# Patient Record
Sex: FEMALE | Race: WHITE | NOT HISPANIC OR LATINO | Employment: OTHER | URBAN - METROPOLITAN AREA
[De-identification: names, ages, dates, MRNs, and addresses within clinical notes are randomized per-mention and may not be internally consistent; named-entity substitution may affect disease eponyms.]

---

## 2018-01-26 ENCOUNTER — OFFICE VISIT (OUTPATIENT)
Dept: INTERNAL MEDICINE | Facility: CLINIC | Age: 83
End: 2018-01-26

## 2018-01-26 VITALS
OXYGEN SATURATION: 97 % | TEMPERATURE: 98.2 F | SYSTOLIC BLOOD PRESSURE: 146 MMHG | BODY MASS INDEX: 24.07 KG/M2 | DIASTOLIC BLOOD PRESSURE: 82 MMHG | HEART RATE: 94 BPM | WEIGHT: 141 LBS | HEIGHT: 64 IN

## 2018-01-26 DIAGNOSIS — K58.1 IRRITABLE BOWEL SYNDROME WITH CONSTIPATION: ICD-10-CM

## 2018-01-26 DIAGNOSIS — K74.60 CIRRHOSIS OF LIVER WITHOUT ASCITES, UNSPECIFIED HEPATIC CIRRHOSIS TYPE (HCC): ICD-10-CM

## 2018-01-26 DIAGNOSIS — J30.89 ACUTE NON-SEASONAL ALLERGIC RHINITIS, UNSPECIFIED TRIGGER: ICD-10-CM

## 2018-01-26 DIAGNOSIS — I10 ESSENTIAL HYPERTENSION: Primary | ICD-10-CM

## 2018-01-26 PROBLEM — C19 COLORECTAL CANCER (HCC): Status: ACTIVE | Noted: 2017-01-23

## 2018-01-26 PROCEDURE — 99214 OFFICE O/P EST MOD 30 MIN: CPT | Performed by: FAMILY MEDICINE

## 2018-01-26 RX ORDER — MELATONIN
1000 DAILY
COMMUNITY

## 2018-01-26 RX ORDER — HYDROCODONE BITARTRATE AND ACETAMINOPHEN 5; 325 MG/1; MG/1
1 TABLET ORAL EVERY 8 HOURS PRN
Qty: 60 TABLET | Refills: 0 | Status: SHIPPED | OUTPATIENT
Start: 2018-01-26 | End: 2018-06-15 | Stop reason: HOSPADM

## 2018-01-26 RX ORDER — AZELASTINE 1 MG/ML
1 SPRAY, METERED NASAL
COMMUNITY
Start: 2017-06-02 | End: 2018-08-27 | Stop reason: ALTCHOICE

## 2018-01-26 RX ORDER — CITALOPRAM 10 MG/1
10 TABLET ORAL
COMMUNITY
Start: 2016-06-20 | End: 2018-01-26

## 2018-01-26 RX ORDER — HYDROCHLOROTHIAZIDE 12.5 MG/1
12.5 TABLET ORAL
COMMUNITY
Start: 2017-01-04 | End: 2018-08-27 | Stop reason: ALTCHOICE

## 2018-01-26 RX ORDER — LISINOPRIL 20 MG/1
40 TABLET ORAL
COMMUNITY
Start: 2017-12-10

## 2018-01-26 RX ORDER — DULOXETIN HYDROCHLORIDE 30 MG/1
30 CAPSULE, DELAYED RELEASE ORAL DAILY
COMMUNITY

## 2018-01-26 RX ORDER — IPRATROPIUM BROMIDE 21 UG/1
2 SPRAY, METERED NASAL EVERY 12 HOURS
Qty: 30 ML | Refills: 12 | Status: SHIPPED | OUTPATIENT
Start: 2018-01-26 | End: 2018-08-27 | Stop reason: ALTCHOICE

## 2018-01-26 RX ORDER — HYDROCODONE BITARTRATE AND ACETAMINOPHEN 5; 325 MG/1; MG/1
TABLET ORAL
COMMUNITY
Start: 2017-10-27 | End: 2018-01-26 | Stop reason: SDUPTHER

## 2018-01-26 RX ORDER — ZOLPIDEM TARTRATE 5 MG/1
TABLET ORAL
COMMUNITY
Start: 2018-01-12

## 2018-01-26 NOTE — PROGRESS NOTES
Subjective   Kerry Donovan is a 86 y.o. female.     Chief Complaint   Patient presents with   • Pain   • Osteoarthritis   • GI Problem   • Hypertension         History of Present Illness   Marianna BIRD patient with history of irritable bowel syndrome taking MiraLAX and stool softener.  There is some history of cirrhosis although she seemed asymptomatic from this of the recent past records.    She is seen pain management for periodic injections with a history of lumbar pain chronically as well as history of broken pelvis and chronic pain relation to that.    She lives alone and has a daughter who lives nearby.  She has chronic neuropathy relating to prior chemotherapy from rectal cancer treatment.    Recently she has developed postnasal drainage present to be allergic origin.      The following portions of the patient's history were reviewed and updated as appropriate: allergies, current medications, past social history and problem list.    Review of Systems   Constitutional: Negative.    HENT: Negative.    Eyes: Negative.    Respiratory: Negative.    Cardiovascular: Negative.    Gastrointestinal: Negative.    Endocrine: Negative.    Genitourinary: Negative.    Musculoskeletal: Negative.    Skin: Negative.    Allergic/Immunologic: Negative.    Neurological: Negative.    Hematological: Negative.    Psychiatric/Behavioral: Negative.        Objective   Vitals:    01/26/18 1245   BP: 146/82   Pulse: 94   Temp: 98.2 °F (36.8 °C)   SpO2: 97%     Physical Exam   Constitutional: She is oriented to person, place, and time. She appears well-developed and well-nourished.   HENT:   Head: Normocephalic and atraumatic.   Right Ear: Tympanic membrane and external ear normal.   Left Ear: Tympanic membrane and external ear normal.   Nose: Nose normal.   Mouth/Throat: Oropharynx is clear and moist.   Eyes: Conjunctivae and EOM are normal. Pupils are equal, round, and reactive to light.   Neck: Normal range of motion. Neck supple. No JVD  present. No thyromegaly present.   Cardiovascular: Normal rate, regular rhythm, normal heart sounds and intact distal pulses.    Pulmonary/Chest: Effort normal and breath sounds normal.   Abdominal: Soft. Bowel sounds are normal.   Musculoskeletal:        Right hip: She exhibits decreased range of motion.        Left hip: She exhibits decreased range of motion.        Thoracic back: She exhibits decreased range of motion.        Lumbar back: She exhibits decreased range of motion.   Lymphadenopathy:     She has no cervical adenopathy.   Neurological: She is alert and oriented to person, place, and time. No cranial nerve deficit. Coordination normal.   Skin: Skin is warm and dry. No rash noted.   Psychiatric: She has a normal mood and affect. Her behavior is normal. Judgment and thought content normal.   Vitals reviewed.      Assessment/Plan   Problem List Items Addressed This Visit        Cardiovascular and Mediastinum    HTN (hypertension) - Primary    Relevant Medications    hydrochlorothiazide (HYDRODIURIL) 12.5 MG tablet    lisinopril (PRINIVIL,ZESTRIL) 20 MG tablet       Digestive    Cirrhosis    IBS (irritable bowel syndrome)      Other Visit Diagnoses     Acute non-seasonal allergic rhinitis, unspecified trigger          Plan: Medications are continued control substances agreement signed hydrocodone 5 mg every 8 when necessary #60.  Recheck in 6 months for Medicare wellness and labs.  Trial of Atrovent nasal spray 0.03% 2 puffs 3 times a day when necessary each nostril.

## 2018-06-13 ENCOUNTER — HOSPITAL ENCOUNTER (OUTPATIENT)
Facility: HOSPITAL | Age: 83
Setting detail: OBSERVATION
Discharge: NURSING, REHAB OR LONG-TERM CARE | End: 2018-06-15
Attending: EMERGENCY MEDICINE | Admitting: HOSPITALIST

## 2018-06-13 DIAGNOSIS — S12.001A CLOSED NONDISPLACED FRACTURE OF FIRST CERVICAL VERTEBRA, UNSPECIFIED FRACTURE MORPHOLOGY, INITIAL ENCOUNTER (HCC): Primary | ICD-10-CM

## 2018-06-13 DIAGNOSIS — S32.010A CLOSED COMPRESSION FRACTURE OF FIRST LUMBAR VERTEBRA, INITIAL ENCOUNTER: ICD-10-CM
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
## 2018-06-13 DIAGNOSIS — Z91.81 AT RISK FOR FALLS: ICD-10-CM

## 2018-06-13 DIAGNOSIS — R26.2 DIFFICULTY WALKING: ICD-10-CM

## 2018-06-13 PROCEDURE — 99283 EMERGENCY DEPT VISIT LOW MDM: CPT

## 2018-06-13 PROCEDURE — 96360 HYDRATION IV INFUSION INIT: CPT

## 2018-06-13 PROCEDURE — 96361 HYDRATE IV INFUSION ADD-ON: CPT

## 2018-06-14 ENCOUNTER — APPOINTMENT (OUTPATIENT)
Dept: CT IMAGING | Facility: HOSPITAL | Age: 83
End: 2018-06-14
Attending: NEUROLOGICAL SURGERY

## 2018-06-14 PROBLEM — T14.8XXA ABRASION OF SKIN: Status: ACTIVE | Noted: 2018-06-14

## 2018-06-14 PROBLEM — S12.001A CLOSED NONDISPLACED FRACTURE OF FIRST CERVICAL VERTEBRA (HCC): Status: ACTIVE | Noted: 2018-06-14

## 2018-06-14 PROBLEM — S01.01XA SCALP LACERATION: Status: ACTIVE | Noted: 2018-06-14

## 2018-06-14 LAB
ALBUMIN SERPL-MCNC: 3.6 G/DL (ref 3.5–5.2)
ALBUMIN/GLOB SERPL: 1.3 G/DL
ALP SERPL-CCNC: 71 U/L (ref 39–117)
ALT SERPL W P-5'-P-CCNC: 26 U/L (ref 1–33)
ANION GAP SERPL CALCULATED.3IONS-SCNC: 10.4 MMOL/L
AST SERPL-CCNC: 39 U/L (ref 1–32)
BASOPHILS # BLD AUTO: 0.02 10*3/MM3 (ref 0–0.2)
BASOPHILS NFR BLD AUTO: 0.3 % (ref 0–1.5)
BILIRUB SERPL-MCNC: 1.8 MG/DL (ref 0.1–1.2)
BUN BLD-MCNC: 11 MG/DL (ref 8–23)
BUN/CREAT SERPL: 19.6 (ref 7–25)
CALCIUM SPEC-SCNC: 9.4 MG/DL (ref 8.6–10.5)
CHLORIDE SERPL-SCNC: 97 MMOL/L (ref 98–107)
CK SERPL-CCNC: 189 U/L (ref 20–180)
CO2 SERPL-SCNC: 28.6 MMOL/L (ref 22–29)
CREAT BLD-MCNC: 0.56 MG/DL (ref 0.57–1)
DEPRECATED RDW RBC AUTO: 49.3 FL (ref 37–54)
EOSINOPHIL # BLD AUTO: 0.11 10*3/MM3 (ref 0–0.7)
EOSINOPHIL NFR BLD AUTO: 1.6 % (ref 0.3–6.2)
ERYTHROCYTE [DISTWIDTH] IN BLOOD BY AUTOMATED COUNT: 13.6 % (ref 11.7–13)
GFR SERPL CREATININE-BSD FRML MDRD: 103 ML/MIN/1.73
GLOBULIN UR ELPH-MCNC: 2.7 GM/DL
GLUCOSE BLD-MCNC: 101 MG/DL (ref 65–99)
HCT VFR BLD AUTO: 37.3 % (ref 35.6–45.5)
HGB BLD-MCNC: 12.2 G/DL (ref 11.9–15.5)
IMM GRANULOCYTES # BLD: 0 10*3/MM3 (ref 0–0.03)
IMM GRANULOCYTES NFR BLD: 0 % (ref 0–0.5)
LYMPHOCYTES # BLD AUTO: 0.92 10*3/MM3 (ref 0.9–4.8)
LYMPHOCYTES NFR BLD AUTO: 13.3 % (ref 19.6–45.3)
MCH RBC QN AUTO: 32.5 PG (ref 26.9–32)
MCHC RBC AUTO-ENTMCNC: 32.7 G/DL (ref 32.4–36.3)
MCV RBC AUTO: 99.5 FL (ref 80.5–98.2)
MONOCYTES # BLD AUTO: 0.85 10*3/MM3 (ref 0.2–1.2)
MONOCYTES NFR BLD AUTO: 12.3 % (ref 5–12)
NEUTROPHILS # BLD AUTO: 5 10*3/MM3 (ref 1.9–8.1)
NEUTROPHILS NFR BLD AUTO: 72.5 % (ref 42.7–76)
PLATELET # BLD AUTO: 179 10*3/MM3 (ref 140–500)
PMV BLD AUTO: 9.6 FL (ref 6–12)
POTASSIUM BLD-SCNC: 3.6 MMOL/L (ref 3.5–5.2)
PROT SERPL-MCNC: 6.3 G/DL (ref 6–8.5)
RBC # BLD AUTO: 3.75 10*6/MM3 (ref 3.9–5.2)
SODIUM BLD-SCNC: 136 MMOL/L (ref 136–145)
WBC NRBC COR # BLD: 6.9 10*3/MM3 (ref 4.5–10.7)

## 2018-06-14 PROCEDURE — 96372 THER/PROPH/DIAG INJ SC/IM: CPT

## 2018-06-14 PROCEDURE — 25010000002 ENOXAPARIN PER 10 MG: Performed by: INTERNAL MEDICINE

## 2018-06-14 PROCEDURE — G0378 HOSPITAL OBSERVATION PER HR: HCPCS

## 2018-06-14 PROCEDURE — 80053 COMPREHEN METABOLIC PANEL: CPT | Performed by: PHYSICIAN ASSISTANT

## 2018-06-14 PROCEDURE — 82550 ASSAY OF CK (CPK): CPT | Performed by: PHYSICIAN ASSISTANT

## 2018-06-14 PROCEDURE — 99204 OFFICE O/P NEW MOD 45 MIN: CPT | Performed by: NURSE PRACTITIONER

## 2018-06-14 PROCEDURE — 85025 COMPLETE CBC W/AUTO DIFF WBC: CPT | Performed by: PHYSICIAN ASSISTANT

## 2018-06-14 PROCEDURE — 96361 HYDRATE IV INFUSION ADD-ON: CPT

## 2018-06-14 PROCEDURE — G8978 MOBILITY CURRENT STATUS: HCPCS

## 2018-06-14 PROCEDURE — 97161 PT EVAL LOW COMPLEX 20 MIN: CPT

## 2018-06-14 PROCEDURE — 72125 CT NECK SPINE W/O DYE: CPT

## 2018-06-14 PROCEDURE — 96360 HYDRATION IV INFUSION INIT: CPT

## 2018-06-14 PROCEDURE — G8979 MOBILITY GOAL STATUS: HCPCS

## 2018-06-14 RX ORDER — SENNA AND DOCUSATE SODIUM 50; 8.6 MG/1; MG/1
2 TABLET, FILM COATED ORAL NIGHTLY PRN
Status: DISCONTINUED | OUTPATIENT
Start: 2018-06-14 | End: 2018-06-15 | Stop reason: HOSPADM

## 2018-06-14 RX ORDER — ACETAMINOPHEN 325 MG/1
650 TABLET ORAL EVERY 4 HOURS PRN
Status: DISCONTINUED | OUTPATIENT
Start: 2018-06-14 | End: 2018-06-14

## 2018-06-14 RX ORDER — ONDANSETRON 4 MG/1
4 TABLET, ORALLY DISINTEGRATING ORAL EVERY 6 HOURS PRN
Status: DISCONTINUED | OUTPATIENT
Start: 2018-06-14 | End: 2018-06-15 | Stop reason: HOSPADM

## 2018-06-14 RX ORDER — IPRATROPIUM BROMIDE 21 UG/1
2 SPRAY, METERED NASAL EVERY 12 HOURS
Status: DISCONTINUED | OUTPATIENT
Start: 2018-06-14 | End: 2018-06-15 | Stop reason: HOSPADM

## 2018-06-14 RX ORDER — SODIUM CHLORIDE 9 MG/ML
100 INJECTION, SOLUTION INTRAVENOUS CONTINUOUS
Status: DISCONTINUED | OUTPATIENT
Start: 2018-06-14 | End: 2018-06-14

## 2018-06-14 RX ORDER — HYDROCHLOROTHIAZIDE 25 MG/1
12.5 TABLET ORAL DAILY
Status: DISCONTINUED | OUTPATIENT
Start: 2018-06-14 | End: 2018-06-15 | Stop reason: HOSPADM

## 2018-06-14 RX ORDER — ZOLPIDEM TARTRATE 5 MG/1
5 TABLET ORAL NIGHTLY PRN
Status: DISCONTINUED | OUTPATIENT
Start: 2018-06-14 | End: 2018-06-15 | Stop reason: HOSPADM

## 2018-06-14 RX ORDER — SODIUM CHLORIDE 0.9 % (FLUSH) 0.9 %
10 SYRINGE (ML) INJECTION AS NEEDED
Status: DISCONTINUED | OUTPATIENT
Start: 2018-06-14 | End: 2018-06-15 | Stop reason: HOSPADM

## 2018-06-14 RX ORDER — OXYCODONE HYDROCHLORIDE AND ACETAMINOPHEN 5; 325 MG/1; MG/1
1 TABLET ORAL EVERY 4 HOURS PRN
Status: DISCONTINUED | OUTPATIENT
Start: 2018-06-14 | End: 2018-06-15 | Stop reason: HOSPADM

## 2018-06-14 RX ORDER — LISINOPRIL 20 MG/1
20 TABLET ORAL
Status: DISCONTINUED | OUTPATIENT
Start: 2018-06-14 | End: 2018-06-15 | Stop reason: HOSPADM

## 2018-06-14 RX ORDER — BISACODYL 5 MG/1
5 TABLET, DELAYED RELEASE ORAL DAILY PRN
Status: DISCONTINUED | OUTPATIENT
Start: 2018-06-14 | End: 2018-06-15 | Stop reason: HOSPADM

## 2018-06-14 RX ORDER — ONDANSETRON 4 MG/1
4 TABLET, ORALLY DISINTEGRATING ORAL ONCE
Status: COMPLETED | OUTPATIENT
Start: 2018-06-14 | End: 2018-06-14

## 2018-06-14 RX ORDER — HYDROCODONE BITARTRATE AND ACETAMINOPHEN 5; 325 MG/1; MG/1
1 TABLET ORAL EVERY 8 HOURS PRN
Status: DISCONTINUED | OUTPATIENT
Start: 2018-06-14 | End: 2018-06-14

## 2018-06-14 RX ORDER — ONDANSETRON 4 MG/1
4 TABLET, FILM COATED ORAL EVERY 6 HOURS PRN
Status: DISCONTINUED | OUTPATIENT
Start: 2018-06-14 | End: 2018-06-15 | Stop reason: HOSPADM

## 2018-06-14 RX ORDER — ALUMINA, MAGNESIA, AND SIMETHICONE 2400; 2400; 240 MG/30ML; MG/30ML; MG/30ML
15 SUSPENSION ORAL EVERY 6 HOURS PRN
Status: DISCONTINUED | OUTPATIENT
Start: 2018-06-14 | End: 2018-06-15 | Stop reason: HOSPADM

## 2018-06-14 RX ORDER — ONDANSETRON 2 MG/ML
4 INJECTION INTRAMUSCULAR; INTRAVENOUS EVERY 6 HOURS PRN
Status: DISCONTINUED | OUTPATIENT
Start: 2018-06-14 | End: 2018-06-15 | Stop reason: HOSPADM

## 2018-06-14 RX ORDER — OXYCODONE HYDROCHLORIDE AND ACETAMINOPHEN 5; 325 MG/1; MG/1
1 TABLET ORAL ONCE
Status: COMPLETED | OUTPATIENT
Start: 2018-06-14 | End: 2018-06-14

## 2018-06-14 RX ADMIN — ONDANSETRON 4 MG: 4 TABLET, ORALLY DISINTEGRATING ORAL at 02:10

## 2018-06-14 RX ADMIN — SODIUM CHLORIDE 100 ML/HR: 9 INJECTION, SOLUTION INTRAVENOUS at 04:17

## 2018-06-14 RX ADMIN — OXYCODONE HYDROCHLORIDE AND ACETAMINOPHEN 1 TABLET: 5; 325 TABLET ORAL at 20:49

## 2018-06-14 RX ADMIN — OXYCODONE HYDROCHLORIDE AND ACETAMINOPHEN 1 TABLET: 5; 325 TABLET ORAL at 11:12

## 2018-06-14 RX ADMIN — ENOXAPARIN SODIUM 40 MG: 40 INJECTION SUBCUTANEOUS at 08:19

## 2018-06-14 RX ADMIN — LISINOPRIL 20 MG: 20 TABLET ORAL at 08:20

## 2018-06-14 RX ADMIN — ZOLPIDEM TARTRATE 5 MG: 5 TABLET ORAL at 20:49

## 2018-06-14 RX ADMIN — HYDROCHLOROTHIAZIDE 12.5 MG: 25 TABLET ORAL at 08:19

## 2018-06-14 RX ADMIN — HYDROCODONE BITARTRATE AND ACETAMINOPHEN 1 TABLET: 5; 325 TABLET ORAL at 08:19

## 2018-06-14 RX ADMIN — OXYCODONE HYDROCHLORIDE AND ACETAMINOPHEN 1 TABLET: 5; 325 TABLET ORAL at 16:28

## 2018-06-14 RX ADMIN — SODIUM CHLORIDE 500 ML: 9 INJECTION, SOLUTION INTRAVENOUS at 00:45

## 2018-06-14 RX ADMIN — OXYCODONE HYDROCHLORIDE AND ACETAMINOPHEN 1 TABLET: 5; 325 TABLET ORAL at 02:10

## 2018-06-14 NOTE — ED NOTES
Called and spoke with Carin at [facility] [phone] was transferred to the RN Carin. She states to fax [phone] and she would send the records. I attempted to fax the first time it was busy the second attempt was no answer. The 3rd attempt was successful for the fax to [facility] was called as well. I spoke with the ER and they stated the patient was not in the hospital system as ever being seen there. The number to [facility] is [phone]. I will attempt to contact them as well again.      Amisha Christensen  06/14/18 0253
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
## 2018-06-14 NOTE — SIGNIFICANT NOTE
PT orders received and chart reviewed. Pt with CT scan ordered per Dr Del Cid. PT will await activity orders per neurosurgery.
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
## 2018-06-14 NOTE — DISCHARGE PLACEMENT REQUEST
"Kerry Donovan (86 y.o. Female)     Date of Birth Social Security Number Address Home Phone MRN    01/16/1932  [id number]  [place] [address] [phone] [id number]    Buddhism Marital Status          Synagogue        Admission Date Admission Type Admitting Provider Attending Provider Department, Room/Bed    6/13/18 Emergency Juli Rodriguez MD Bruggensmith, Paula L, MD [facility] [address] [place], P584/1    Discharge Date Discharge Disposition Discharge Destination                       Attending Provider:  Juli Rodriguez MD    Allergies:  Amoxicillin, Bee Venom    Isolation:  None   Infection:  None   Code Status:  FULL    Ht:  167.6 cm (66\")   Wt:  62.6 kg (138 lb)    Admission Cmt:  None   Principal Problem:  None                Active Insurance as of 6/13/2018     Primary Coverage     Payor Plan Insurance Group Employer/Plan Group    [facility] SUPP [id number]     Payor Plan Address Payor Plan Phone Number Effective From Effective To    [address]  12/1/2016     [facility] [id number]       Subscriber Name Subscriber Birth Date Member ID       KERRY DONOVAN 1/16/1932 [id number]           Secondary Coverage     Payor Plan Insurance Group Employer/Plan Group    MEDICARE MEDICARE A & B      Payor Plan Address Payor Plan Phone Number Effective From Effective To    [address] [phone] 1/1/1997     [facility] [id number]       Subscriber Name Subscriber Birth Date Member ID       KERRY DONOVAN 1/16/1932 [id number]                 Emergency Contacts      (Rel.) Home Phone Work Phone Mobile Phone    Dickson Prajpaati (Daughter) [phone] -- --              "

## 2018-06-14 NOTE — ED NOTES
Called [facility] and they stated they had no medical records. Called Carin at [phone] the nursing manager. I will fax her at [phone] to obtain medical records. Once she gets the release she will send medical records over.      Amisha Christensen  06/14/18 0039
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
## 2018-06-15 ENCOUNTER — TELEPHONE (OUTPATIENT)
Dept: INTERNAL MEDICINE | Facility: CLINIC | Age: 83
End: 2018-06-15

## 2018-06-15 ENCOUNTER — TELEPHONE (OUTPATIENT)
Dept: NEUROSURGERY | Facility: CLINIC | Age: 83
End: 2018-06-15

## 2018-06-15 VITALS
BODY MASS INDEX: 22.18 KG/M2 | HEIGHT: 66 IN | HEART RATE: 96 BPM | RESPIRATION RATE: 15 BRPM | TEMPERATURE: 98 F | WEIGHT: 138 LBS | OXYGEN SATURATION: 95 % | DIASTOLIC BLOOD PRESSURE: 79 MMHG | SYSTOLIC BLOOD PRESSURE: 116 MMHG

## 2018-06-15 DIAGNOSIS — S22.019A CLOSED FRACTURE OF FIRST THORACIC VERTEBRA, UNSPECIFIED FRACTURE MORPHOLOGY, INITIAL ENCOUNTER (HCC): Primary | ICD-10-CM

## 2018-06-15 PROCEDURE — G8979 MOBILITY GOAL STATUS: HCPCS

## 2018-06-15 PROCEDURE — 99213 OFFICE O/P EST LOW 20 MIN: CPT | Performed by: NEUROLOGICAL SURGERY

## 2018-06-15 PROCEDURE — 96374 THER/PROPH/DIAG INJ IV PUSH: CPT

## 2018-06-15 PROCEDURE — 25010000002 ENOXAPARIN PER 10 MG: Performed by: INTERNAL MEDICINE

## 2018-06-15 PROCEDURE — 96372 THER/PROPH/DIAG INJ SC/IM: CPT

## 2018-06-15 PROCEDURE — G0378 HOSPITAL OBSERVATION PER HR: HCPCS

## 2018-06-15 PROCEDURE — 25010000002 ONDANSETRON PER 1 MG: Performed by: INTERNAL MEDICINE

## 2018-06-15 PROCEDURE — G8978 MOBILITY CURRENT STATUS: HCPCS

## 2018-06-15 RX ORDER — OXYCODONE HYDROCHLORIDE AND ACETAMINOPHEN 5; 325 MG/1; MG/1
1 TABLET ORAL EVERY 4 HOURS PRN
Qty: 24 TABLET | Refills: 0 | Status: SHIPPED | OUTPATIENT
Start: 2018-06-15 | End: 2018-06-24

## 2018-06-15 RX ORDER — SENNA AND DOCUSATE SODIUM 50; 8.6 MG/1; MG/1
2 TABLET, FILM COATED ORAL NIGHTLY PRN
Start: 2018-06-15 | End: 2018-08-27 | Stop reason: ALTCHOICE

## 2018-06-15 RX ADMIN — ONDANSETRON 4 MG: 2 INJECTION, SOLUTION INTRAMUSCULAR; INTRAVENOUS at 08:26

## 2018-06-15 RX ADMIN — ENOXAPARIN SODIUM 40 MG: 40 INJECTION SUBCUTANEOUS at 08:26

## 2018-06-15 RX ADMIN — OXYCODONE HYDROCHLORIDE AND ACETAMINOPHEN 1 TABLET: 5; 325 TABLET ORAL at 13:40

## 2018-06-15 RX ADMIN — LISINOPRIL 20 MG: 20 TABLET ORAL at 08:27

## 2018-06-15 RX ADMIN — DOCUSATE SODIUM -SENNOSIDES 2 TABLET: 50; 8.6 TABLET, COATED ORAL at 13:39

## 2018-06-15 RX ADMIN — HYDROCHLOROTHIAZIDE 12.5 MG: 25 TABLET ORAL at 08:27

## 2018-06-15 RX ADMIN — IPRATROPIUM BROMIDE 2 SPRAY: 21 SPRAY NASAL at 08:28

## 2018-06-15 RX ADMIN — OXYCODONE HYDROCHLORIDE AND ACETAMINOPHEN 1 TABLET: 5; 325 TABLET ORAL at 08:26

## 2018-06-15 NOTE — PROGRESS NOTES
Continued Stay Note  [facility]     Patient Name: Kerry Donovan  MRN: [id number]  Today's Date: 6/15/2018    Admit Date: 6/13/2018          Discharge Plan     Row Name 06/15/18 1054       Plan    Plan Skilled care at [facility]    Plan Comments Met with patient.  Bed is available at [facility] today for patient.  Spoke with Marianna with [facility] to confirm.  Patient is able to go by car.  Notified Jb Forman and waiting for call back.      Final Discharge Disposition Code 03 - skilled nursing facility (SNF)    Final Note Skilled care at [facility]              Discharge Codes    No documentation.       Expected Discharge Date and Time     Expected Discharge Date Expected Discharge Time    Rod 15, 2018             Ghazal Dennis RN
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
## 2018-06-15 NOTE — TELEPHONE ENCOUNTER
Patient is scheduled with MARIA A Pillai on Friday, 6/29/18 at 2:15PM. She will have cervical xrays prior at [facility]. I called [facility] and spoke with Joy and she will add to the discharge. Letter/Forms sent.

## 2018-06-15 NOTE — SIGNIFICANT NOTE
06/15/18 1042   Rehab Treatment   Discipline physical therapist   Reason Treatment Not Performed unavailable for treatment  (getting bath, will check later)

## 2018-06-15 NOTE — PROGRESS NOTES
[facility] PROGRESS NOTE    PATIENT IDENTIFICATION:   Name:  Kerry Donovan      MRN:  [id number]     86 y.o.  female                   Principal Problem:    Closed nondisplaced fracture of first cervical vertebra  Active Problems:    HTN (hypertension)    Insomnia    Abrasion of skin    Scalp laceration        Subjective   CC: soreness of abrasions  Interval History: in c collar.  Upright eating.      Objective     Vital signs in last 24 hours:  Temp:  [98 °F (36.7 °C)-98.3 °F (36.8 °C)] 98.1 °F (36.7 °C)  Heart Rate:  [82-95] 92  Resp:  [15-18] 15  BP: (132-180)/() 151/100      Intake/Output last 3 shifts:  No intake/output data recorded.  Intake/Output this shift:  No intake/output data recorded.    LABS        Invalid input(s): CBC  Lab Results   Component Value Date    CALCIUM 9.4 06/14/2018     Results from last 7 days  Lab Units 06/14/18  0039   SODIUM mmol/L 136   POTASSIUM mmol/L 3.6   CHLORIDE mmol/L 97*   CO2 mmol/L 28.6   BUN mg/dL 11   CREATININE mg/dL 0.56*   GLUCOSE mg/dL 101*   CALCIUM mg/dL 9.4   WBC 10*3/mm3 6.90   HEMOGLOBIN g/dL 12.2   PLATELETS 10*3/mm3 179   ALT (SGPT) U/L 26   AST (SGOT) U/L 39*     Physical Exam:  RAYO  str 5/5 and age appropriate      4 - Opens eyes on own  6 - Follows simple motor commands  5 - Alert and oriented    Collar too loose and I adjusted and discussed with patient and nurse.    ASSESSMENT  Assessment/Plan     T1 3 column injury  In a less frail patient surgery might be indicated  She is clearly of very poor bone stock  She cannot stuff tissues in her collar as this is a ploy to loosen it and I discussed this with her  Needs to adhere or will require surgery which would be high risk       LOS: 0 days     OK for d/c from my POC, needs follow up with xrays in 2 weeks.  I will attempt to discuss with her fasmily later as I missed them twice yesterday and once this am already.    Gaston Del Cid IV, MD

## 2018-06-15 NOTE — DISCHARGE INSTRUCTIONS
Will need to wear the collar at all times.  She is not to remove the collar for bathing and instead should do sponge baths.    Wound care instructions: Cleansed both with saline, covered with Versatel, applied hydrogel then gauze and secured with conform

## 2018-06-15 NOTE — DISCHARGE INSTR - LAB
Friday 6/29/18 at 2:15 pm appointment with Ivelisse Robles. (1 hour before xrays to be done at the hospital)
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
## 2018-06-19 NOTE — TELEPHONE ENCOUNTER
Spoke with the pt's Daughter and she's in [facility] and has already been referred to Dr Del Cid

## 2018-06-29 ENCOUNTER — OFFICE VISIT (OUTPATIENT)
Dept: NEUROSURGERY | Facility: CLINIC | Age: 83
End: 2018-06-29

## 2018-06-29 ENCOUNTER — HOSPITAL ENCOUNTER (OUTPATIENT)
Dept: GENERAL RADIOLOGY | Facility: HOSPITAL | Age: 83
Discharge: HOME | End: 2018-06-29
Admitting: NURSE PRACTITIONER

## 2018-06-29 VITALS
BODY MASS INDEX: 22.34 KG/M2 | HEART RATE: 80 BPM | DIASTOLIC BLOOD PRESSURE: 78 MMHG | SYSTOLIC BLOOD PRESSURE: 120 MMHG | HEIGHT: 66 IN | RESPIRATION RATE: 18 BRPM | WEIGHT: 139 LBS

## 2018-06-29 DIAGNOSIS — S12.091D OTHER CLOSED NONDISPLACED FRACTURE OF FIRST CERVICAL VERTEBRA WITH ROUTINE HEALING, SUBSEQUENT ENCOUNTER: Primary | ICD-10-CM

## 2018-06-29 DIAGNOSIS — S22.019A CLOSED FRACTURE OF FIRST THORACIC VERTEBRA, UNSPECIFIED FRACTURE MORPHOLOGY, INITIAL ENCOUNTER (HCC): ICD-10-CM

## 2018-06-29 PROCEDURE — 72040 X-RAY EXAM NECK SPINE 2-3 VW: CPT

## 2018-06-29 PROCEDURE — 99214 OFFICE O/P EST MOD 30 MIN: CPT | Performed by: NURSE PRACTITIONER

## 2018-06-29 RX ORDER — SODIUM PHOSPHATE, DIBASIC AND SODIUM PHOSPHATE, MONOBASIC 7; 19 G/133ML; G/133ML
ENEMA RECTAL ONCE AS NEEDED
COMMUNITY
End: 2018-08-27 | Stop reason: ALTCHOICE

## 2018-06-29 RX ORDER — ACETAMINOPHEN 500 MG
500 TABLET ORAL EVERY 6 HOURS PRN
COMMUNITY

## 2018-06-29 RX ORDER — POLYETHYLENE GLYCOL 3350 17 G/17G
17 POWDER, FOR SOLUTION ORAL DAILY
COMMUNITY

## 2018-06-29 RX ORDER — BISACODYL 10 MG
10 SUPPOSITORY, RECTAL RECTAL DAILY
COMMUNITY

## 2018-06-29 RX ORDER — HYDROCODONE BITARTRATE AND ACETAMINOPHEN 5; 325 MG/1; MG/1
1 TABLET ORAL EVERY 4 HOURS PRN
COMMUNITY
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
## 2018-08-27 ENCOUNTER — HOSPITAL ENCOUNTER (OUTPATIENT)
Dept: CT IMAGING | Facility: HOSPITAL | Age: 83
Discharge: HOME | End: 2018-08-27
Admitting: NURSE PRACTITIONER

## 2018-08-27 ENCOUNTER — OFFICE VISIT (OUTPATIENT)
Dept: NEUROSURGERY | Facility: CLINIC | Age: 83
End: 2018-08-27

## 2018-08-27 VITALS
SYSTOLIC BLOOD PRESSURE: 140 MMHG | HEART RATE: 89 BPM | HEIGHT: 66 IN | RESPIRATION RATE: 18 BRPM | DIASTOLIC BLOOD PRESSURE: 82 MMHG | WEIGHT: 140 LBS | BODY MASS INDEX: 22.5 KG/M2

## 2018-08-27 DIAGNOSIS — S12.091D OTHER CLOSED NONDISPLACED FRACTURE OF FIRST CERVICAL VERTEBRA WITH ROUTINE HEALING, SUBSEQUENT ENCOUNTER: ICD-10-CM

## 2018-08-27 DIAGNOSIS — S22.011D: Primary | ICD-10-CM

## 2018-08-27 PROCEDURE — 99213 OFFICE O/P EST LOW 20 MIN: CPT | Performed by: NURSE PRACTITIONER

## 2018-08-27 PROCEDURE — 72125 CT NECK SPINE W/O DYE: CPT
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
## 2018-09-13 ENCOUNTER — OFFICE VISIT (OUTPATIENT)
Dept: NEUROSURGERY | Facility: CLINIC | Age: 83
End: 2018-09-13

## 2018-09-13 ENCOUNTER — HOSPITAL ENCOUNTER (OUTPATIENT)
Dept: GENERAL RADIOLOGY | Facility: HOSPITAL | Age: 83
Discharge: HOME | End: 2018-09-13
Admitting: NURSE PRACTITIONER

## 2018-09-13 ENCOUNTER — HOSPITAL ENCOUNTER (OUTPATIENT)
Dept: GENERAL RADIOLOGY | Facility: HOSPITAL | Age: 83
Discharge: HOME | End: 2018-09-13

## 2018-09-13 VITALS
BODY MASS INDEX: 22.5 KG/M2 | WEIGHT: 140 LBS | HEIGHT: 66 IN | DIASTOLIC BLOOD PRESSURE: 80 MMHG | SYSTOLIC BLOOD PRESSURE: 132 MMHG

## 2018-09-13 DIAGNOSIS — S22.011D: ICD-10-CM

## 2018-09-13 DIAGNOSIS — S22.011D: Primary | ICD-10-CM

## 2018-09-13 PROCEDURE — 99213 OFFICE O/P EST LOW 20 MIN: CPT | Performed by: NEUROLOGICAL SURGERY

## 2018-09-13 PROCEDURE — 72072 X-RAY EXAM THORAC SPINE 3VWS: CPT

## 2018-09-13 PROCEDURE — 72052 X-RAY EXAM NECK SPINE 6/>VWS: CPT

## 2018-09-13 NOTE — PROGRESS NOTES
Subjective   Patient ID: Kerry Donovan is a 86 y.o. female who is here today for follow-up for thoracic vertebral compression fracture. She had cervical and thoracic xrays today at [facility]. She presents accompanied by her daughter.    History of Present Illness  Patient presents for follow up of her C2 fracture.  She reports no neck pain even with flex/ext and lateral bending.  No gait issues.  NO b/b issues.      The following portions of the patient's history were reviewed and updated as appropriate: allergies, current medications, past family history, past medical history, past social history, past surgical history and problem list.    Review of Systems   Musculoskeletal: Negative for arthralgias, back pain and neck pain.   Neurological: Negative for weakness and numbness.       Objective   Physical Exam   Constitutional: She is oriented to person, place, and time.   Neurological: She is oriented to person, place, and time.     Neurologic Exam     Mental Status   Oriented to person, place, and time.     Motor Exam     Strength   Right deltoid: 5/5  Left deltoid: 5/5  Right biceps: 5/5  Left biceps: 5/5  Right triceps: 5/5  Left triceps: 5/5  Right wrist extension: 5/5  Left wrist extension: 5/5  Right interossei: 5/5  Left interossei: 5/5  Right iliopsoas: 5/5  Left iliopsoas: 5/5  Right anterior tibial: 5/5  Left anterior tibial: 5/5  Right gastroc: 5/5  Left gastroc: 5/5    Sensory Exam   Right arm light touch: normal  Left arm light touch: normal  Right leg light touch: decreased from toes  Left leg light touch: decreased from toes  Right arm pinprick: normal  Left arm pinprick: normal  Right leg pinprick: decreased from toes  Left leg pinprick: decreased from toes    Gait, Coordination, and Reflexes     Reflexes   Right Marinelli: absent  Left Marinelli: absent  Right ankle clonus: absent  Left ankle clonus: absent     Painless rom of the cervical spine        Assessment/Plan   Independent Review of Radiographic  Studies:    Flex and ext are stable.      Medical Decision Making:  We will see her as needed.  She will wean the brace.  They will call for any issues.    Kerry was seen today for thoracic vcf.    Diagnoses and all orders for this visit:    Closed stable burst fracture of first thoracic vertebra with routine healing      Return if symptoms worsen or fail to improve.

## 2019-07-09 ENCOUNTER — APPOINTMENT (OUTPATIENT)
Dept: GENERAL RADIOLOGY | Facility: HOSPITAL | Age: 84
End: 2019-07-09

## 2019-07-09 ENCOUNTER — HOSPITAL ENCOUNTER (EMERGENCY)
Facility: HOSPITAL | Age: 84
Discharge: HOME | End: 2019-07-09
Attending: EMERGENCY MEDICINE | Admitting: EMERGENCY MEDICINE

## 2019-07-09 ENCOUNTER — APPOINTMENT (OUTPATIENT)
Dept: CT IMAGING | Facility: HOSPITAL | Age: 84
End: 2019-07-09

## 2019-07-09 VITALS
WEIGHT: 137 LBS | TEMPERATURE: 98 F | OXYGEN SATURATION: 99 % | DIASTOLIC BLOOD PRESSURE: 92 MMHG | HEIGHT: 66 IN | BODY MASS INDEX: 22.02 KG/M2 | HEART RATE: 80 BPM | RESPIRATION RATE: 18 BRPM | SYSTOLIC BLOOD PRESSURE: 181 MMHG

## 2019-07-09 DIAGNOSIS — S30.0XXA SACRAL CONTUSION, INITIAL ENCOUNTER: ICD-10-CM

## 2019-07-09 DIAGNOSIS — S09.90XA MINOR HEAD INJURY, INITIAL ENCOUNTER: Primary | ICD-10-CM

## 2019-07-09 PROCEDURE — 70450 CT HEAD/BRAIN W/O DYE: CPT

## 2019-07-09 PROCEDURE — 72125 CT NECK SPINE W/O DYE: CPT

## 2019-07-09 PROCEDURE — 99283 EMERGENCY DEPT VISIT LOW MDM: CPT

## 2019-07-09 PROCEDURE — 72220 X-RAY EXAM SACRUM TAILBONE: CPT

## 2019-07-09 RX ORDER — ACETAMINOPHEN 500 MG
1000 TABLET ORAL ONCE
Status: COMPLETED | OUTPATIENT
Start: 2019-07-09 | End: 2019-07-09

## 2019-07-09 RX ADMIN — ACETAMINOPHEN 1000 MG: 500 TABLET, FILM COATED ORAL at 13:13

## 2019-07-09 NOTE — ED PROVIDER NOTES
EMERGENCY DEPARTMENT ENCOUNTER    Room Number:  38/38  Date seen:  7/9/2019  Time seen: 12:48 PM  PCP: Feroz Nuñez Jr., MD  Historian: patient      HPI:  Chief Complaint: headache  A complete HPI/ROS/PMH/PSH/SH/FH are unobtainable due to: nothing  Context: Kerry Donovan is a 87 y.o. female who presents to the ED from assisted living via EMS c/o an occipital headache that began PTA s/p a fall. The patient reports she was helping her friend who has MS when she lost her balance and fell. The patient reports she fell backwards and hit the back of her head. The patient denies LOC. The patient also reports she sustained a skin tear over her left elbow and mild pain of the sacrum but denies neck pain, left elbow pain, bilateral hip pain or any other sustaining injuries. The patient denies that she is currently on any blood thinners. The patient reports her tetanus shot is up-to-date. There are no other complaints at this time.      Pain Location: occipital area of the head  Radiation: none specified  Quality: pain  Intensity/Severity: moderate  Duration: began PTA  Onset quality: sudden  Timing: constant  Progression: not specified  Aggravating Factors: none specified  Alleviating Factors: none specified  Previous Episodes: none specified  Treatment before arrival: none specified  Associated Symptoms: skin tear over the left elbow and pain of the sacrum          PAST MEDICAL HISTORY  Active Ambulatory Problems     Diagnosis Date Noted   • Anxiety 09/17/2015   • Cirrhosis (CMS/HCC) 03/18/2016   • Colorectal cancer (CMS/HCC) 01/23/2017   • Esophagitis 09/17/2015   • HTN (hypertension) 12/20/2012   • IBS (irritable bowel syndrome) 06/20/2016   • Insomnia 12/20/2012   • Uncontrolled hypertension 01/20/2017   • Closed stable burst fracture of first thoracic vertebra with routine healing 06/14/2018   • Abrasion of skin 06/14/2018   • Scalp laceration 06/14/2018     Resolved Ambulatory Problems     Diagnosis Date Noted   • No  Resolved Ambulatory Problems     Past Medical History:   Diagnosis Date   • Broken bones    • Cancer (CMS/HCC)    • Depression    • History of head injury    • Hypertension          PAST SURGICAL HISTORY  Past Surgical History:   Procedure Laterality Date   • CHOLECYSTECTOMY     • RECTAL SURGERY     • TONSILLECTOMY     • UMBILICAL HERNIA REPAIR           FAMILY HISTORY  Family History   Problem Relation Age of Onset   • Heart attack Mother    • Heart disease Mother    • Depression Father    • Alcohol abuse Father    • Cancer Sister    • Cancer Paternal Aunt          SOCIAL HISTORY  Social History     Socioeconomic History   • Marital status:      Spouse name: Not on file   • Number of children: Not on file   • Years of education: Not on file   • Highest education level: Not on file   Occupational History   • Occupation: retired   Tobacco Use   • Smoking status: Former Smoker     Last attempt to quit:      Years since quittin.5   • Smokeless tobacco: Never Used   Substance and Sexual Activity   • Alcohol use: Yes     Alcohol/week: 6.0 oz     Types: 10 Glasses of wine per week     Comment: monthly   • Drug use: No   • Sexual activity: Defer         ALLERGIES  Amoxicillin and Bee venom        REVIEW OF SYSTEMS  Review of Systems   Constitutional: Negative for diaphoresis and fever.   HENT: Negative for congestion.    Eyes: Negative for visual disturbance.   Respiratory: Negative for shortness of breath.    Cardiovascular: Negative for palpitations.   Gastrointestinal: Negative for blood in stool and vomiting.   Endocrine: Negative for polyuria.   Genitourinary: Negative for flank pain.   Musculoskeletal: Negative for arthralgias (left elbow or hips), joint swelling and neck pain.        Mild pain of the sacrum   Skin: Positive for wound (skin tear on left elbow).   Neurological: Positive for headaches (occipital). Negative for seizures and syncope.   Hematological: Negative for adenopathy.    Psychiatric/Behavioral: Negative for sleep disturbance.            PHYSICAL EXAM  ED Triage Vitals [07/09/19 1242]   Temp Heart Rate Resp BP SpO2   98 °F (36.7 °C) 80 18 161/82 97 %      Temp src Heart Rate Source Patient Position BP Location FiO2 (%)   Tympanic Monitor Lying -- --         GENERAL: no acute distress  HENT: nares patent, 2 cm occipital scalp hematoma  EYES: no scleral icterus, pupils are 4 mm reactive bilaterally  CV: regular rhythm, normal rate  RESPIRATORY: normal effort  ABDOMEN: soft  MUSCULOSKELETAL: no deformity, no midline C, T, or L tenderness, mild tenderness of the sacrum, pelvis is stable and nontender, no point tenderness throughout BUE and BLE  NEURO: alert, moves all extremities, follows commands  SKIN: warm, dry, 2 cm skin tear over the left elbow    Vital signs and nursing notes reviewed.          RADIOLOGY  Xr Sacrum & Coccyx    Result Date: 7/9/2019  SACRUM AND COCCYX X-RAYS  HISTORY: Fall earlier today. Abrasions on the buttocks. Pain.  A total of four x-rays of the sacrum and coccyx are provided. These are correlated with abdomen x-ray from 03/17/2008 and a pelvis CT from February 2008.  FINDINGS: There is abnormal sclerosis and cortical deformity in the sacrum bilaterally where old sacral fractures were demonstrated previously. Advanced degenerative disc change is observed at L5-S1 and there is facet arthropathy in the lower lumbar spine. No definite acute sacral fracture is present but sensitivity is somewhat limited due to demineralization and the old bony deformity. There are old healed fractures of the left ischio pubic rami.      There are old sacral issue pubic ramus fractures. No acute abnormality is identified.  This report was finalized on 7/9/2019 1:59 PM by Dr. Gaston Esposito M.D.      Ct Head Without Contrast    Result Date: 7/9/2019  CT HEAD AND CERVICAL SPINE WITHOUT CONTRAST  CLINICAL HISTORY:  Fell, hitting the back of her head, with neck pain and headache.  CT  scan of the head was obtained with 2 mm axial bone algorithm and 3 mm axial soft tissue algorithm images. No intravenous contrast was administered.  FINDINGS: There is no evidence for a calvarial fracture. There is no evidence for an acute extra-axial hemorrhage. Incidental note is made of chronic small vessel ischemic phenomena and a subcentimeter chronic infarct within the left corona radiata. Atherosclerotic changes are also incidentally noted within the intracranial vasculature.  Otherwise, the ventricles, sulci, and cisterns are age appropriate. The basal ganglia and thalami are unremarkable in appearance. The posterior fossa structures are within normal limits.       No evidence for acute traumatic intracranial pathology.  CT scan of the cervical spine was obtained with 0.5 mm axial images. Sagittal and coronal reconstructed images were obtained.  FINDINGS: There is a compression fracture at T1 with approximately 60% to 70% loss of vertebral body height within the maximally compressed anterior portion of the vertebral body. However, there is relative preservation of vertebral body height more posteriorly. There is some retropulsion of the posterior cortex of T1 resulting in mild canal narrowing. Very similar findings were seen on the prior study of 08/27/2018. Additionally, there is mild vertebral body height loss at T2 which is estimated to be approximately 20%. Again, similar findings were noted on the prior study of 08/27/2018. Otherwise, there is no evidence for acute fracture or bony malalignment within the cervical spine.  At C2-3, there is prominent right facet hypertrophic change but no significant bony canal or foraminal narrowing is noted.  At C3-4, again there is prominent bilateral facet hypertrophic change but no significant canal or foraminal narrowing.  At C4-5, there is prominent left facet hypertrophy but no significant canal or foraminal stenosis is identified.  At C5-6, there is a disc  osteophyte complex that mildly indents the ventral subarachnoid space. There is mild-to-moderate left foraminal narrowing secondary to uncovertebral joint hypertrophy.  At C6-7 and at C7-T1, there is no significant canal or foraminal stenosis.  IMPRESSION:  There are chronic appearing compression deformities at T1 and T2 which have a very similar appearance when compared to prior CT scan of the cervical spine dated 08/27/2018. Of note, there is some retropulsion at the level of the most severe compression deformity which is located at T1.  Multilevel degenerative phenomena are noted within the cervical spine as discussed in detail above.  There is no evidence for a calvarial fracture. There is no evidence for an acute extra-axial hemorrhage. There are moderate changes of chronic small vessel ischemic phenomena. There is a subcentimeter chronic infarct within the left corona radiata. Atherosclerotic changes are incidentally noted within the intracranial vasculature.  The ventricles, sulci, and cisterns are age appropriate.  Radiation dose reduction techniques were utilized, including automated exposure control and exposure modulation based on body size.       Ct Cervical Spine Without Contrast    Result Date: 7/9/2019  CT HEAD AND CERVICAL SPINE WITHOUT CONTRAST  CLINICAL HISTORY:  Fell, hitting the back of her head, with neck pain and headache.  CT scan of the head was obtained with 2 mm axial bone algorithm and 3 mm axial soft tissue algorithm images. No intravenous contrast was administered.  FINDINGS: There is no evidence for a calvarial fracture. There is no evidence for an acute extra-axial hemorrhage. Incidental note is made of chronic small vessel ischemic phenomena and a subcentimeter chronic infarct within the left corona radiata. Atherosclerotic changes are also incidentally noted within the intracranial vasculature.  Otherwise, the ventricles, sulci, and cisterns are age appropriate. The basal ganglia and  thalami are unremarkable in appearance. The posterior fossa structures are within normal limits.       No evidence for acute traumatic intracranial pathology.  CT scan of the cervical spine was obtained with 0.5 mm axial images. Sagittal and coronal reconstructed images were obtained.  FINDINGS: There is a compression fracture at T1 with approximately 60% to 70% loss of vertebral body height within the maximally compressed anterior portion of the vertebral body. However, there is relative preservation of vertebral body height more posteriorly. There is some retropulsion of the posterior cortex of T1 resulting in mild canal narrowing. Very similar findings were seen on the prior study of 08/27/2018. Additionally, there is mild vertebral body height loss at T2 which is estimated to be approximately 20%. Again, similar findings were noted on the prior study of 08/27/2018. Otherwise, there is no evidence for acute fracture or bony malalignment within the cervical spine.  At C2-3, there is prominent right facet hypertrophic change but no significant bony canal or foraminal narrowing is noted.  At C3-4, again there is prominent bilateral facet hypertrophic change but no significant canal or foraminal narrowing.  At C4-5, there is prominent left facet hypertrophy but no significant canal or foraminal stenosis is identified.  At C5-6, there is a disc osteophyte complex that mildly indents the ventral subarachnoid space. There is mild-to-moderate left foraminal narrowing secondary to uncovertebral joint hypertrophy.  At C6-7 and at C7-T1, there is no significant canal or foraminal stenosis.  IMPRESSION:  There are chronic appearing compression deformities at T1 and T2 which have a very similar appearance when compared to prior CT scan of the cervical spine dated 08/27/2018. Of note, there is some retropulsion at the level of the most severe compression deformity which is located at T1.  Multilevel degenerative phenomena are  noted within the cervical spine as discussed in detail above.  There is no evidence for a calvarial fracture. There is no evidence for an acute extra-axial hemorrhage. There are moderate changes of chronic small vessel ischemic phenomena. There is a subcentimeter chronic infarct within the left corona radiata. Atherosclerotic changes are incidentally noted within the intracranial vasculature.  The ventricles, sulci, and cisterns are age appropriate.  Radiation dose reduction techniques were utilized, including automated exposure control and exposure modulation based on body size.         Ordered the above noted radiological studies. Reviewed by me in PACS.          PROCEDURES  Procedures                MEDICATIONS GIVEN IN ER  Medications   acetaminophen (TYLENOL) tablet 1,000 mg (1,000 mg Oral Given 7/9/19 1313)                   PROGRESS AND CONSULTS  1254 Ordered CT Head, CT C Spine, and Sacrum & Coccyx XR for further evaluation. Also ordered Tylenol for pain management.    1501 Rechecked the patient who is resting comfortably and in NAD. Patient is stable. HR- 80 Temp- 98 °F (36.7 °C) (Tympanic) O2 sat- 99%. Informed the patient of her CT Head, CT C Spine, and Sacrum & Coccyx XR which showed nothing acute. Discussed the plan for discharge with instructions to f/u with her PMD for further evaluation and management. Strict RTER warnings given. Pt understands and agrees with the plan, all questions answered.        MEDICAL DECISION MAKING        MDM  Number of Diagnoses or Management Options     Amount and/or Complexity of Data Reviewed  Tests in the radiology section of CPT®: ordered and reviewed (Xr Sacrum & Coccyx    Result Date: 7/9/2019  Narrative: SACRUM AND COCCYX X-RAYS  HISTORY: Fall earlier today. Abrasions on the buttocks. Pain.  A total of four x-rays of the sacrum and coccyx are provided. These are correlated with abdomen x-ray from 03/17/2008 and a pelvis CT from February 2008.  FINDINGS: There is  abnormal sclerosis and cortical deformity in the sacrum bilaterally where old sacral fractures were demonstrated previously. Advanced degenerative disc change is observed at L5-S1 and there is facet arthropathy in the lower lumbar spine. No definite acute sacral fracture is present but sensitivity is somewhat limited due to demineralization and the old bony deformity. There are old healed fractures of the left ischio pubic rami.      Impression: There are old sacral issue pubic ramus fractures. No acute abnormality is identified.  This report was finalized on 7/9/2019 1:59 PM by Dr. Gaston Esposito M.D.      Ct Head Without Contrast    Result Date: 7/9/2019  Narrative: CT HEAD AND CERVICAL SPINE WITHOUT CONTRAST  CLINICAL HISTORY:  Fell, hitting the back of her head, with neck pain and headache.  CT scan of the head was obtained with 2 mm axial bone algorithm and 3 mm axial soft tissue algorithm images. No intravenous contrast was administered.  FINDINGS: There is no evidence for a calvarial fracture. There is no evidence for an acute extra-axial hemorrhage. Incidental note is made of chronic small vessel ischemic phenomena and a subcentimeter chronic infarct within the left corona radiata. Atherosclerotic changes are also incidentally noted within the intracranial vasculature.  Otherwise, the ventricles, sulci, and cisterns are age appropriate. The basal ganglia and thalami are unremarkable in appearance. The posterior fossa structures are within normal limits.      Impression:  No evidence for acute traumatic intracranial pathology.  CT scan of the cervical spine was obtained with 0.5 mm axial images. Sagittal and coronal reconstructed images were obtained.  FINDINGS: There is a compression fracture at T1 with approximately 60% to 70% loss of vertebral body height within the maximally compressed anterior portion of the vertebral body. However, there is relative preservation of vertebral body height more  posteriorly. There is some retropulsion of the posterior cortex of T1 resulting in mild canal narrowing. Very similar findings were seen on the prior study of 08/27/2018. Additionally, there is mild vertebral body height loss at T2 which is estimated to be approximately 20%. Again, similar findings were noted on the prior study of 08/27/2018. Otherwise, there is no evidence for acute fracture or bony malalignment within the cervical spine.  At C2-3, there is prominent right facet hypertrophic change but no significant bony canal or foraminal narrowing is noted.  At C3-4, again there is prominent bilateral facet hypertrophic change but no significant canal or foraminal narrowing.  At C4-5, there is prominent left facet hypertrophy but no significant canal or foraminal stenosis is identified.  At C5-6, there is a disc osteophyte complex that mildly indents the ventral subarachnoid space. There is mild-to-moderate left foraminal narrowing secondary to uncovertebral joint hypertrophy.  At C6-7 and at C7-T1, there is no significant canal or foraminal stenosis.  IMPRESSION:  There are chronic appearing compression deformities at T1 and T2 which have a very similar appearance when compared to prior CT scan of the cervical spine dated 08/27/2018. Of note, there is some retropulsion at the level of the most severe compression deformity which is located at T1.  Multilevel degenerative phenomena are noted within the cervical spine as discussed in detail above.  There is no evidence for a calvarial fracture. There is no evidence for an acute extra-axial hemorrhage. There are moderate changes of chronic small vessel ischemic phenomena. There is a subcentimeter chronic infarct within the left corona radiata. Atherosclerotic changes are incidentally noted within the intracranial vasculature.  The ventricles, sulci, and cisterns are age appropriate.  Radiation dose reduction techniques were utilized, including automated exposure  control and exposure modulation based on body size.       Ct Cervical Spine Without Contrast    Result Date: 7/9/2019  Narrative: CT HEAD AND CERVICAL SPINE WITHOUT CONTRAST  CLINICAL HISTORY:  Fell, hitting the back of her head, with neck pain and headache.  CT scan of the head was obtained with 2 mm axial bone algorithm and 3 mm axial soft tissue algorithm images. No intravenous contrast was administered.  FINDINGS: There is no evidence for a calvarial fracture. There is no evidence for an acute extra-axial hemorrhage. Incidental note is made of chronic small vessel ischemic phenomena and a subcentimeter chronic infarct within the left corona radiata. Atherosclerotic changes are also incidentally noted within the intracranial vasculature.  Otherwise, the ventricles, sulci, and cisterns are age appropriate. The basal ganglia and thalami are unremarkable in appearance. The posterior fossa structures are within normal limits.      Impression:  No evidence for acute traumatic intracranial pathology.  CT scan of the cervical spine was obtained with 0.5 mm axial images. Sagittal and coronal reconstructed images were obtained.  FINDINGS: There is a compression fracture at T1 with approximately 60% to 70% loss of vertebral body height within the maximally compressed anterior portion of the vertebral body. However, there is relative preservation of vertebral body height more posteriorly. There is some retropulsion of the posterior cortex of T1 resulting in mild canal narrowing. Very similar findings were seen on the prior study of 08/27/2018. Additionally, there is mild vertebral body height loss at T2 which is estimated to be approximately 20%. Again, similar findings were noted on the prior study of 08/27/2018. Otherwise, there is no evidence for acute fracture or bony malalignment within the cervical spine.  At C2-3, there is prominent right facet hypertrophic change but no significant bony canal or foraminal narrowing  is noted.  At C3-4, again there is prominent bilateral facet hypertrophic change but no significant canal or foraminal narrowing.  At C4-5, there is prominent left facet hypertrophy but no significant canal or foraminal stenosis is identified.  At C5-6, there is a disc osteophyte complex that mildly indents the ventral subarachnoid space. There is mild-to-moderate left foraminal narrowing secondary to uncovertebral joint hypertrophy.  At C6-7 and at C7-T1, there is no significant canal or foraminal stenosis.  IMPRESSION:  There are chronic appearing compression deformities at T1 and T2 which have a very similar appearance when compared to prior CT scan of the cervical spine dated 08/27/2018. Of note, there is some retropulsion at the level of the most severe compression deformity which is located at T1.  Multilevel degenerative phenomena are noted within the cervical spine as discussed in detail above.  There is no evidence for a calvarial fracture. There is no evidence for an acute extra-axial hemorrhage. There are moderate changes of chronic small vessel ischemic phenomena. There is a subcentimeter chronic infarct within the left corona radiata. Atherosclerotic changes are incidentally noted within the intracranial vasculature.  The ventricles, sulci, and cisterns are age appropriate.  Radiation dose reduction techniques were utilized, including automated exposure control and exposure modulation based on body size.)  Decide to obtain previous medical records or to obtain history from someone other than the patient: yes (Epic)  Review and summarize past medical records: yes (The patient was last hospitalized at [facility] in 06/2018 for a closed nondisplaced fracture of first cervical vertebra.)  Independent visualization of images, tracings, or specimens: yes    Patient Progress  Patient progress: stable             DIAGNOSIS  Final diagnoses:   Minor head injury, initial encounter   Sacral contusion, initial encounter          DISPOSITION  DISCHARGE    Patient discharged in stable condition.    Reviewed implications of results, diagnosis, meds, responsibility to follow up, warning signs and symptoms of possible worsening, potential complications and reasons to return to ER, including any new or worsening symptoms.    Patient/Family voiced understanding of above instructions.    Discussed plan for discharge, as there is no emergent indication for admission. Patient referred to primary care provider for BP management due to today's BP. Pt/family is agreeable and understands need for follow up and repeat testing.  Pt is aware that discharge does not mean that nothing is wrong but it indicates no emergency is present that requires admission and they must continue care with follow-up as given below or physician of their choice.     FOLLOW-UP  Feroz Nuñez Jr., MD  [address] [address]  [phone]      As needed         Medication List      No changes were made to your prescriptions during this visit.                   Latest Documented Vital Signs:  As of 3:00 PM  BP- (!) 181/92 HR- 80 Temp- 98 °F (36.7 °C) (Tympanic) O2 sat- 99%        --  Documentation assistance provided by vic Elder for Dr. ROGER Anderson MD.  Information recorded by the vic was done at my direction and has been verified and validated by me.      Please note that portions of this were completed with a voice recognition program.       Damaris Elder  07/09/19 1501       Quentin Anderson MD  07/09/19 [id number]

## 2019-07-09 NOTE — ED NOTES
Pt fell at assisted living today fell backwards lost balance hitting head. C/o right side hip pain. Skin tear to left elbow. No LOC, no blood thinners.      Roberta Vega RN  07/09/19 3999

## 2020-11-06 NOTE — PROGRESS NOTES
Continued Stay Note  [facility]     Patient Name: Kerry Donovan  MRN: [id number]  Today's Date: 6/14/2018    Admit Date: 6/13/2018          Discharge Plan     Row Name 06/14/18 1204       Plan    Plan [facility]    Patient/Family in Agreement with Plan yes    Plan Comments [facility] [place] [facility]- facility has approved patient and has a bed tomorrow (pending verification of hospital stay/qualifying stay at [facility]- records requested in ED).            Expected Discharge Date and Time     Expected Discharge Date Expected Discharge Time    Rod 15, 2018             Julian Taylor RN     0